# Patient Record
Sex: MALE | Race: WHITE | NOT HISPANIC OR LATINO
[De-identification: names, ages, dates, MRNs, and addresses within clinical notes are randomized per-mention and may not be internally consistent; named-entity substitution may affect disease eponyms.]

---

## 2023-06-13 PROBLEM — Z00.00 ENCOUNTER FOR PREVENTIVE HEALTH EXAMINATION: Status: ACTIVE | Noted: 2023-06-13

## 2023-06-14 ENCOUNTER — APPOINTMENT (OUTPATIENT)
Dept: FAMILY MEDICINE | Facility: CLINIC | Age: 33
End: 2023-06-14
Payer: COMMERCIAL

## 2023-06-14 VITALS
BODY MASS INDEX: 21.98 KG/M2 | WEIGHT: 145 LBS | HEIGHT: 68.11 IN | HEART RATE: 75 BPM | RESPIRATION RATE: 16 BRPM | TEMPERATURE: 98.1 F | SYSTOLIC BLOOD PRESSURE: 110 MMHG | DIASTOLIC BLOOD PRESSURE: 70 MMHG | OXYGEN SATURATION: 99 %

## 2023-06-14 DIAGNOSIS — Z01.84 ENCOUNTER FOR ANTIBODY RESPONSE EXAMINATION: ICD-10-CM

## 2023-06-14 DIAGNOSIS — Z11.1 ENCOUNTER FOR SCREENING FOR RESPIRATORY TUBERCULOSIS: ICD-10-CM

## 2023-06-14 DIAGNOSIS — Z83.3 FAMILY HISTORY OF DIABETES MELLITUS: ICD-10-CM

## 2023-06-14 DIAGNOSIS — F98.8 OTHER SPECIFIED BEHAVIORAL AND EMOTIONAL DISORDERS WITH ONSET USUALLY OCCURRING IN CHILDHOOD AND ADOLESCENCE: ICD-10-CM

## 2023-06-14 DIAGNOSIS — J45.909 UNSPECIFIED ASTHMA, UNCOMPLICATED: ICD-10-CM

## 2023-06-14 DIAGNOSIS — F32.A ANXIETY DISORDER, UNSPECIFIED: ICD-10-CM

## 2023-06-14 DIAGNOSIS — Z13.220 ENCOUNTER FOR SCREENING FOR LIPOID DISORDERS: ICD-10-CM

## 2023-06-14 DIAGNOSIS — Z83.49 FAMILY HISTORY OF OTHER ENDOCRINE, NUTRITIONAL AND METABOLIC DISEASES: ICD-10-CM

## 2023-06-14 DIAGNOSIS — Z00.00 ENCOUNTER FOR GENERAL ADULT MEDICAL EXAMINATION W/OUT ABNORMAL FINDINGS: ICD-10-CM

## 2023-06-14 DIAGNOSIS — F41.9 ANXIETY DISORDER, UNSPECIFIED: ICD-10-CM

## 2023-06-14 DIAGNOSIS — Z78.9 OTHER SPECIFIED HEALTH STATUS: ICD-10-CM

## 2023-06-14 DIAGNOSIS — Z02.83 ENCOUNTER FOR BLOOD-ALCOHOL AND BLOOD-DRUG TEST: ICD-10-CM

## 2023-06-14 PROCEDURE — 99385 PREV VISIT NEW AGE 18-39: CPT | Mod: 25

## 2023-06-14 PROCEDURE — 36415 COLL VENOUS BLD VENIPUNCTURE: CPT

## 2023-06-14 RX ORDER — DULOXETINE HYDROCHLORIDE 60 MG/1
60 CAPSULE, DELAYED RELEASE ORAL
Refills: 0 | Status: ACTIVE | COMMUNITY

## 2023-06-14 RX ORDER — DEXTROAMPHETAMINE SACCHARATE, AMPHETAMINE ASPARTATE, DEXTROAMPHETAMINE SULFATE, AND AMPHETAMINE SULFATE 5; 5; 5; 5 MG/1; MG/1; MG/1; MG/1
20 TABLET ORAL
Refills: 0 | Status: ACTIVE | COMMUNITY

## 2023-06-14 NOTE — HISTORY OF PRESENT ILLNESS
[FreeTextEntry1] : 31 yo male pt, with ADHD, depression/anxiety hx, childhood asthma, presenting today for new pt annual physical examination and form completion for school clinicals. 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 \par He reports previous PCP no longer accepts his insurance.\par Pt is due for annual physical. Needs immune titers, TB screening, and urine drug screen test for a hospital rotation.\par He reports doing well otherwise with no acute concerns. Denies cigarette smoking or alcohol use.\par On duloxetine 60mg for anxiety/depression, and adderall 20mg BID for ADHD. Pt has a Psychiatrist, Dr. Adams, and therapist, Humza, that he follows with for management.\par Reports famhx of hypothyroidism in mother and sister, DM in a grandparent. No other issues. Pt requesting for TSH level check today due to famhx. He denies any throat pain/discomfort, denies swallowing difficulty.

## 2023-06-14 NOTE — HEALTH RISK ASSESSMENT
[Good] : ~his/her~  mood as  good [No] : No [Never (0 pts)] : Never (0 points) [0] : 2) Feeling down, depressed, or hopeless: Not at all (0) [PHQ-2 Negative - No further assessment needed] : PHQ-2 Negative - No further assessment needed [HIV test declined] : HIV test declined [Hepatitis C test declined] : Hepatitis C test declined [Student] : student [Graduate School] : graduate school [Feels Safe at Home] : Feels safe at home [Fully functional (bathing, dressing, toileting, transferring, walking, feeding)] : Fully functional (bathing, dressing, toileting, transferring, walking, feeding) [Fully functional (using the telephone, shopping, preparing meals, housekeeping, doing laundry, using] : Fully functional and needs no help or supervision to perform IADLs (using the telephone, shopping, preparing meals, housekeeping, doing laundry, using transportation, managing medications and managing finances) [Reports normal functional visual acuity (ie: able to read med bottle)] : Reports normal functional visual acuity [Reports changes in dental health] : Reports changes in dental health [Safety elements used in home] : safety elements used in home [Seat Belt] :  uses seat belt [Never] : Never [0-4] : 0-4 [Audit-CScore] : 0 [[id number]] : 0 [Change in mental status noted] : No change in mental status noted [High Risk Behavior] : no high risk behavior [Reports changes in hearing] : Reports no changes in hearing [Reports changes in vision] : Reports no changes in vision [Travel to Developing Areas] : does not  travel to developing areas [TB Exposure] : is not being exposed to tuberculosis [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

## 2023-06-14 NOTE — PLAN
[FreeTextEntry1] : All labs drawn in the office today\par Form to be fully completed and signed once all labs have resulted and wnl. Pt will  completed form from office

## 2023-06-19 ENCOUNTER — NON-APPOINTMENT (OUTPATIENT)
Age: 33
End: 2023-06-19

## 2023-06-19 LAB
AMPHET UR-MCNC: NEGATIVE NG/ML
BARBITURATES UR-MCNC: NEGATIVE NG/ML
BENZODIAZ UR-MCNC: NEGATIVE NG/ML
CHOLEST SERPL-MCNC: 151 MG/DL
COCAINE METAB.OTHER UR-MCNC: NEGATIVE NG/ML
CREATININE, URINE: 134 MG/DL
FENTANYL, URINE: NEGATIVE NG/ML
HBV SURFACE AB SER QL: REACTIVE
HBV SURFACE AG SER QL: NONREACTIVE
HDLC SERPL-MCNC: 40 MG/DL
LDLC SERPL CALC-MCNC: 99 MG/DL
M TB IFN-G BLD-IMP: NEGATIVE
METHADONE UR-MCNC: NEGATIVE NG/ML
MEV IGG FLD QL IA: 51.7 AU/ML
MEV IGG+IGM SER-IMP: POSITIVE
MUV AB SER-ACNC: POSITIVE
MUV IGG SER QL IA: 45.1 AU/ML
NONHDLC SERPL-MCNC: 111 MG/DL
OPIATES UR-MCNC: NEGATIVE NG/ML
OXYCODONE/OXYMORPHONE, URINE: NEGATIVE NG/ML
PCP UR-MCNC: NEGATIVE NG/ML
PH, URINE: 5.7
PLEASE NOTE: DRUGSCRUR: NORMAL
QUANTIFERON TB PLUS MITOGEN MINUS NIL: >10 IU/ML
QUANTIFERON TB PLUS NIL: 0.01 IU/ML
QUANTIFERON TB PLUS TB1 MINUS NIL: 0.01 IU/ML
QUANTIFERON TB PLUS TB2 MINUS NIL: 0.01 IU/ML
RUBV IGG FLD-ACNC: 13.7 INDEX
RUBV IGG SER-IMP: POSITIVE
THC UR QL: NEGATIVE NG/ML
TRIGL SERPL-MCNC: 62 MG/DL
TSH SERPL-ACNC: 0.91 UIU/ML
VZV AB TITR SER: POSITIVE
VZV IGG SER IF-ACNC: 3525 INDEX